# Patient Record
Sex: MALE | Race: WHITE | NOT HISPANIC OR LATINO | URBAN - METROPOLITAN AREA
[De-identification: names, ages, dates, MRNs, and addresses within clinical notes are randomized per-mention and may not be internally consistent; named-entity substitution may affect disease eponyms.]

---

## 2021-03-19 ENCOUNTER — EMERGENCY (EMERGENCY)
Facility: HOSPITAL | Age: 41
LOS: 1 days | Discharge: HOME | End: 2021-03-19
Attending: EMERGENCY MEDICINE | Admitting: EMERGENCY MEDICINE
Payer: MEDICAID

## 2021-03-19 VITALS
RESPIRATION RATE: 16 BRPM | HEIGHT: 68 IN | WEIGHT: 229.94 LBS | DIASTOLIC BLOOD PRESSURE: 93 MMHG | HEART RATE: 76 BPM | OXYGEN SATURATION: 98 % | SYSTOLIC BLOOD PRESSURE: 153 MMHG | TEMPERATURE: 98 F

## 2021-03-19 VITALS
RESPIRATION RATE: 16 BRPM | DIASTOLIC BLOOD PRESSURE: 85 MMHG | HEART RATE: 71 BPM | SYSTOLIC BLOOD PRESSURE: 140 MMHG | OXYGEN SATURATION: 96 %
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
## 2021-03-19 DIAGNOSIS — S05.01XA INJURY OF CONJUNCTIVA AND CORNEAL ABRASION WITHOUT FOREIGN BODY, RIGHT EYE, INITIAL ENCOUNTER: ICD-10-CM
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
## 2021-03-19 DIAGNOSIS — H57.11 OCULAR PAIN, RIGHT EYE: ICD-10-CM

## 2021-03-19 DIAGNOSIS — I10 ESSENTIAL (PRIMARY) HYPERTENSION: ICD-10-CM

## 2021-03-19 PROCEDURE — 99283 EMERGENCY DEPT VISIT LOW MDM: CPT

## 2021-03-19 RX ORDER — OFLOXACIN 0.3 %
1 DROPS OPHTHALMIC (EYE)
Qty: 5 | Refills: 1
Start: 2021-03-19 | End: 2021-04-01

## 2021-03-19 NOTE — ED PROVIDER NOTE - OBJECTIVE STATEMENT
40 y/o M w/ PMHx HTN presents to the ED c/o R eye pain and tearing since yesterday. Notes that x2 days ago was cutting metal without eye protection and felt something go into his R eye but had no sx until yesterday. Denies vision changes. No other acute complaints. Wears glasses for distance vision. Tetanus UTD 2 yr ago. 42 y/o M w/ PMHx HTN presents to the ED c/o R eye pain and tearing since yesterday. Notes that x2 days ago was cutting metal without eye protection and felt something go into his R eye but had no sx until yesterday. Denies vision changes. No other acute complaints. Wears prescription glasses for distance vision. Tetanus UTD 2 yr ago.

## 2021-03-19 NOTE — ED PROVIDER NOTE - EYES, MLM
R eye: conjunctiva injected, clear tearing, tetracaine drops instilled w/ pt stating eye pain resolved after, woods lamp and fluorescein stain noted to have corneal abrasion at 6 oclock position on cornea, no FB noted, both eyelids everted w/o FB noted

## 2021-03-19 NOTE — ED PROVIDER NOTE - NSFOLLOWUPCLINICS_GEN_ALL_ED_FT
[facility] - Ophthalmology Clinic  Ophthalmology  [address] [place] [address], 1[address]  [place], [place] [address]  Phone: [phone]  Fax:   Follow Up Time: 4-6 Days

## 2021-03-19 NOTE — ED PROVIDER NOTE - CONSTITUTIONAL, MLM
normal... Well appearing, awake, alert, oriented to person, place, time/situation and in no apparent distress. Well appearing, awake, alert, oriented   and in no apparent distress.

## 2021-03-19 NOTE — ED ADULT TRIAGE NOTE - CHIEF COMPLAINT QUOTE
Pt reports right eye pain and redness since last night. Pt states he was cutting a metal object 2 days ago and felt something hit his eye. Denies vision changes, drainage, fevers.

## 2021-03-19 NOTE — ED ADULT NURSE NOTE - OBJECTIVE STATEMENT
pt reports redness and pain to right eye since last night.  pt states 2 days ago was cutting a metal pipe and felt a particle hit the right eye.  pt denies drainage.  denies change in vision.  pt wears prescription eyeglasses.

## 2021-03-19 NOTE — ED PROVIDER NOTE - CLINICAL SUMMARY MEDICAL DECISION MAKING FREE TEXT BOX
40 y/o M presents w/ R eye pain and tearing since yesterday s/p the day before was cutting metal without eye protection and felt something go into his eye. No visual changes. Upon exam noted to have corneal abrasion. Will f/u w/ ophthalmology in a couple days. Return precautions given. Will prescribe Ocuflox ophthalmic solution. Pt expressed understanding. 40 y/o M presents w/ R eye pain and tearing since yesterday s/p the day before was cutting metal without eye protection and felt something go into his eye. No visual changes. Upon exam noted to have corneal abrasion. Pt prescribed antibiotic eye drops- Ocuflox ophthalmic solution. Will f/u w/ ophthalmology in a couple days. Return precautions given. Pt expressed understanding.

## 2021-03-19 NOTE — ED PROVIDER NOTE - PATIENT PORTAL LINK FT
You can access the FollowMyHealth Patient Portal offered by [facility] by registering at the following website: [email or web address] By joining [employer]’s FollowMyHealth portal, you will also be able to view your health information using other applications (apps) compatible with our system.

## 2021-03-19 NOTE — ED PROVIDER NOTE - NSFOLLOWUPINSTRUCTIONS_ED_ALL_ED_FT
Please follow up with an eye doctor in 2-5 days. Return to the ER if you develop any concerning symptoms.     Corneal Abrasion    The cornea is the clear covering at the front and center of the eye. This very thin tissue is made up of many layers. If a scratch or injury causes the corneal epithelium to come off, it is called a corneal abrasion. Symptoms include eye pain, redness, tearing, difficulty keeping eye open, and light sensitivity. Do not drive or operate machinery if your eye is patched.  Antibiotic eye drops may be prescribed to reduce the risk of infection.  It is important to follow up with an ophthalmologist (eye doctor) to ensure proper healing.    SEEK IMMEDIATE MEDICAL CARE IF YOU HAVE ANY OF THE FOLLOWING SYMPTOMS: discharge from eyes, changes in vision, fever, or swelling.

## 2021-05-21 ENCOUNTER — EMERGENCY (EMERGENCY)
Facility: HOSPITAL | Age: 41
LOS: 1 days | Discharge: HOME | End: 2021-05-21
Attending: EMERGENCY MEDICINE | Admitting: EMERGENCY MEDICINE
Payer: COMMERCIAL

## 2021-05-21 VITALS
SYSTOLIC BLOOD PRESSURE: 123 MMHG | HEART RATE: 65 BPM | RESPIRATION RATE: 18 BRPM | DIASTOLIC BLOOD PRESSURE: 83 MMHG | OXYGEN SATURATION: 98 %

## 2021-05-21 VITALS
DIASTOLIC BLOOD PRESSURE: 100 MMHG | HEIGHT: 68 IN | HEART RATE: 92 BPM | RESPIRATION RATE: 18 BRPM | WEIGHT: 240.08 LBS | TEMPERATURE: 98 F | SYSTOLIC BLOOD PRESSURE: 140 MMHG | OXYGEN SATURATION: 98 %

## 2021-05-21 DIAGNOSIS — K63.89 OTHER SPECIFIED DISEASES OF INTESTINE: ICD-10-CM

## 2021-05-21 DIAGNOSIS — R10.32 LEFT LOWER QUADRANT PAIN: ICD-10-CM

## 2021-05-21 DIAGNOSIS — I10 ESSENTIAL (PRIMARY) HYPERTENSION: ICD-10-CM

## 2021-05-21 DIAGNOSIS — Z90.49 ACQUIRED ABSENCE OF OTHER SPECIFIED PARTS OF DIGESTIVE TRACT: Chronic | ICD-10-CM

## 2021-05-21 LAB
ALBUMIN SERPL ELPH-MCNC: 4.4 G/DL — SIGNIFICANT CHANGE UP (ref 3.3–5)
ALP SERPL-CCNC: 56 U/L — SIGNIFICANT CHANGE UP (ref 40–120)
ALT FLD-CCNC: 82 U/L — HIGH (ref 10–45)
ANION GAP SERPL CALC-SCNC: 10 MMOL/L — SIGNIFICANT CHANGE UP (ref 5–17)
APPEARANCE UR: CLEAR — SIGNIFICANT CHANGE UP
AST SERPL-CCNC: 45 U/L — HIGH (ref 10–40)
BASOPHILS # BLD AUTO: 0.03 K/UL — SIGNIFICANT CHANGE UP (ref 0–0.2)
BASOPHILS NFR BLD AUTO: 0.4 % — SIGNIFICANT CHANGE UP (ref 0–2)
BILIRUB SERPL-MCNC: 0.6 MG/DL — SIGNIFICANT CHANGE UP (ref 0.2–1.2)
BILIRUB UR-MCNC: NEGATIVE — SIGNIFICANT CHANGE UP
BUN SERPL-MCNC: 13 MG/DL — SIGNIFICANT CHANGE UP (ref 7–23)
CALCIUM SERPL-MCNC: 9.6 MG/DL — SIGNIFICANT CHANGE UP (ref 8.4–10.5)
CHLORIDE SERPL-SCNC: 101 MMOL/L — SIGNIFICANT CHANGE UP (ref 96–108)
CO2 SERPL-SCNC: 29 MMOL/L — SIGNIFICANT CHANGE UP (ref 22–31)
COLOR SPEC: YELLOW — SIGNIFICANT CHANGE UP
CREAT SERPL-MCNC: 0.69 MG/DL — SIGNIFICANT CHANGE UP (ref 0.5–1.3)
DIFF PNL FLD: NEGATIVE — SIGNIFICANT CHANGE UP
EOSINOPHIL # BLD AUTO: 0.16 K/UL — SIGNIFICANT CHANGE UP (ref 0–0.5)
EOSINOPHIL NFR BLD AUTO: 2 % — SIGNIFICANT CHANGE UP (ref 0–6)
GLUCOSE SERPL-MCNC: 124 MG/DL — HIGH (ref 70–99)
GLUCOSE UR QL: NEGATIVE — SIGNIFICANT CHANGE UP
HCT VFR BLD CALC: 45.8 % — SIGNIFICANT CHANGE UP (ref 39–50)
HGB BLD-MCNC: 16.2 G/DL — SIGNIFICANT CHANGE UP (ref 13–17)
IMM GRANULOCYTES NFR BLD AUTO: 0.4 % — SIGNIFICANT CHANGE UP (ref 0–1.5)
KETONES UR-MCNC: NEGATIVE — SIGNIFICANT CHANGE UP
LEUKOCYTE ESTERASE UR-ACNC: NEGATIVE — SIGNIFICANT CHANGE UP
LIDOCAIN IGE QN: 50 U/L — SIGNIFICANT CHANGE UP (ref 7–60)
LYMPHOCYTES # BLD AUTO: 2.56 K/UL — SIGNIFICANT CHANGE UP (ref 1–3.3)
LYMPHOCYTES # BLD AUTO: 32 % — SIGNIFICANT CHANGE UP (ref 13–44)
MCHC RBC-ENTMCNC: 28.7 PG — SIGNIFICANT CHANGE UP (ref 27–34)
MCHC RBC-ENTMCNC: 35.4 GM/DL — SIGNIFICANT CHANGE UP (ref 32–36)
MCV RBC AUTO: 81.2 FL — SIGNIFICANT CHANGE UP (ref 80–100)
MONOCYTES # BLD AUTO: 0.75 K/UL — SIGNIFICANT CHANGE UP (ref 0–0.9)
MONOCYTES NFR BLD AUTO: 9.4 % — SIGNIFICANT CHANGE UP (ref 2–14)
NEUTROPHILS # BLD AUTO: 4.47 K/UL — SIGNIFICANT CHANGE UP (ref 1.8–7.4)
NEUTROPHILS NFR BLD AUTO: 55.8 % — SIGNIFICANT CHANGE UP (ref 43–77)
NITRITE UR-MCNC: NEGATIVE — SIGNIFICANT CHANGE UP
NRBC # BLD: 0 /100 WBCS — SIGNIFICANT CHANGE UP (ref 0–0)
PH UR: 7 — SIGNIFICANT CHANGE UP (ref 5–8)
PLATELET # BLD AUTO: 204 K/UL — SIGNIFICANT CHANGE UP (ref 150–400)
POTASSIUM SERPL-MCNC: 4 MMOL/L — SIGNIFICANT CHANGE UP (ref 3.5–5.3)
POTASSIUM SERPL-SCNC: 4 MMOL/L — SIGNIFICANT CHANGE UP (ref 3.5–5.3)
PROT SERPL-MCNC: 8.1 G/DL — SIGNIFICANT CHANGE UP (ref 6–8.3)
PROT UR-MCNC: NEGATIVE MG/DL — SIGNIFICANT CHANGE UP
RBC # BLD: 5.64 M/UL — SIGNIFICANT CHANGE UP (ref 4.2–5.8)
RBC # FLD: 12.3 % — SIGNIFICANT CHANGE UP (ref 10.3–14.5)
SODIUM SERPL-SCNC: 140 MMOL/L — SIGNIFICANT CHANGE UP (ref 135–145)
SP GR SPEC: 1.02 — SIGNIFICANT CHANGE UP (ref 1–1.03)
UROBILINOGEN FLD QL: 0.2 E.U./DL — SIGNIFICANT CHANGE UP
WBC # BLD: 8 K/UL — SIGNIFICANT CHANGE UP (ref 3.8–10.5)
WBC # FLD AUTO: 8 K/UL — SIGNIFICANT CHANGE UP (ref 3.8–10.5)

## 2021-05-21 PROCEDURE — 80053 COMPREHEN METABOLIC PANEL: CPT

## 2021-05-21 PROCEDURE — 99285 EMERGENCY DEPT VISIT HI MDM: CPT

## 2021-05-21 PROCEDURE — 99284 EMERGENCY DEPT VISIT MOD MDM: CPT | Mod: 25

## 2021-05-21 PROCEDURE — 81003 URINALYSIS AUTO W/O SCOPE: CPT

## 2021-05-21 PROCEDURE — 74177 CT ABD & PELVIS W/CONTRAST: CPT | Mod: 26,MG

## 2021-05-21 PROCEDURE — 36415 COLL VENOUS BLD VENIPUNCTURE: CPT

## 2021-05-21 PROCEDURE — G1004: CPT

## 2021-05-21 PROCEDURE — 85025 COMPLETE CBC W/AUTO DIFF WBC: CPT

## 2021-05-21 PROCEDURE — 74177 CT ABD & PELVIS W/CONTRAST: CPT

## 2021-05-21 PROCEDURE — 96374 THER/PROPH/DIAG INJ IV PUSH: CPT | Mod: XU

## 2021-05-21 PROCEDURE — 83690 ASSAY OF LIPASE: CPT

## 2021-05-21 RX ORDER — IOHEXOL 300 MG/ML
30 INJECTION, SOLUTION INTRAVENOUS ONCE
Refills: 0 | Status: COMPLETED | OUTPATIENT
Start: 2021-05-21 | End: 2021-05-21

## 2021-05-21 RX ORDER — MORPHINE SULFATE 50 MG/1
4 CAPSULE, EXTENDED RELEASE ORAL ONCE
Refills: 0 | Status: DISCONTINUED | OUTPATIENT
Start: 2021-05-21 | End: 2021-05-21

## 2021-05-21 RX ADMIN — MORPHINE SULFATE 4 MILLIGRAM(S): 50 CAPSULE, EXTENDED RELEASE ORAL at 12:25

## 2021-05-21 RX ADMIN — MORPHINE SULFATE 4 MILLIGRAM(S): 50 CAPSULE, EXTENDED RELEASE ORAL at 13:05

## 2021-05-21 RX ADMIN — IOHEXOL 30 MILLILITER(S): 300 INJECTION, SOLUTION INTRAVENOUS at 12:25

## 2021-05-21 NOTE — ED PROVIDER NOTE - PATIENT PORTAL LINK FT
You can access the FollowMyHealth Patient Portal offered by [facility] by registering at the following website: [email or web address]. By joining [employer]’s FollowMyHealth portal, you will also be able to view your health information using other applications (apps) compatible with our system.

## 2021-05-21 NOTE — ED ADULT TRIAGE NOTE - CHIEF COMPLAINT QUOTE
pt c/o LUQ pain x1 day, wrapping around to flank. Denies N/V/D or hematuria. Denies hx of abdominal surgeries.

## 2021-05-21 NOTE — ED PROVIDER NOTE - CLINICAL SUMMARY MEDICAL DECISION MAKING FREE TEXT BOX
42 yo male with 2 days of left mid abd pain, worse with movement.  will get abd labs, ct abd ro diverticulitis/obstruction  pain meds 40 yo male with 2 days of left mid abd pain, worse with movement.  will get abd labs, ct abd ro diverticulitis/obstruction  pain meds  labs normal  ct shows epiploic appendigitis

## 2021-05-21 NOTE — ED PROVIDER NOTE - OBJECTIVE STATEMENT
left sided mid abd pain started yesterday.  worse with movement  no fever, no urinary symptoms, no n/v, normal BM this morning  never had this before

## 2021-05-21 NOTE — ED PROVIDER NOTE - NSFOLLOWUPINSTRUCTIONS_ED_ALL_ED_FT
follow up regular doctor in 2-3 days  return for worsening symptoms, fever, vomiting        Epiploic Appendagitis       Epiploic appendagitis (EA) is swelling and irritation of pouches (epiploic appendages) that are attached to the end portion of the large intestine (colon). These pouches contain fat and are attached to the outside of the colon. This condition causes sudden pain in the lower abdomen.    EA is rare, and it usually goes away on its own. It can feel like other abdomen (abdominal) conditions, such as appendicitis, a gallbladder attack (cholecystitis), or diverticulitis.      What are the causes?  This condition may be caused by:  •Blocked blood flow due to a blood clot.      •Twisting (torsion) of the epiploic appendages.      •Conditions that cause swelling and inflammation of nearby tissue, such as long-term (chronic) diarrhea, Crohn disease, or ulcerative colitis.        What increases the risk?  You are more likely to develop this condition if:  •You are 40–50 years old.      •You are male.      •You are overweight.        What are the signs or symptoms?    The most common symptom of this condition is lower abdominal pain that starts suddenly and can be severe. Pain can be anywhere in the lower abdomen, but it is more common on the left side. The pain may get worse with movement or when pressing on your abdomen.  The following symptoms are possible, but they are not common:  •Fever.      •Nausea.      •Diarrhea or constipation.        How is this diagnosed?  Your health care provider may suspect EA if you have sudden lower abdominal pain without other symptoms. The condition may be diagnosed based on:  •CT scan. This is the best way to diagnose EA.      •Your symptoms and a physical exam.      •Ultrasound.      •A blood test (complete blood count, CBC).      •A procedure to look inside your abdomen using a lighted scope that has a tiny camera on the end (laparoscopy). This may be done to confirm the diagnosis.        How is this treated?    EA usually goes away without treatment. Your health care provider may recommend NSAIDs (such as aspirin or ibuprofen) to reduce pain and inflammation. In rare cases, if the condition does not improve or it keeps coming back, you may need surgery to remove the appendages.      Follow these instructions at home:    •Take over-the-counter and prescription medicines only as told by your health care provider.      •Return to your normal activities as told by your health care provider. Ask your health care provider what activities are safe for you.      •Keep all follow-up visits as told by your health care provider. This is important.        Contact a health care provider if:    •You have a fever.      •You develop nausea, vomiting, diarrhea, or constipation.      •Your pain gets worse.      •Your pain lasts longer than 10 days.        Get help right away if:    •You have severe pain that does not get better after you take medicine.        Summary    •Epiploic appendagitis (EA) is swelling and irritation of pouches (epiploic appendages) that are attached to the outside of the colon. The colon is the end portion of the large intestine.      •EA can feel like other abdominal conditions, such as appendicitis, a gallbladder attack (cholecystitis), or diverticulitis.      •EA usually goes away without treatment. Your health care provider may recommend NSAIDs (such as aspirin or ibuprofen) to reduce pain and inflammation.      This information is not intended to replace advice given to you by your health care provider. Make sure you discuss any questions you have with your health care provider.      Document Revised: 04/09/2020 Document Reviewed: 07/05/2018    [employer] Patient Education © 2021 [employer]

## 2024-12-17 ENCOUNTER — RESULT REVIEW (OUTPATIENT)
Age: 44
End: 2024-12-17

## 2024-12-17 ENCOUNTER — INPATIENT (INPATIENT)
Facility: HOSPITAL | Age: 44
LOS: 0 days | Discharge: HOME | End: 2024-12-18
Attending: PSYCHIATRY & NEUROLOGY | Admitting: PSYCHIATRY & NEUROLOGY
Payer: COMMERCIAL

## 2024-12-17 VITALS
TEMPERATURE: 98 F | WEIGHT: 253.75 LBS | HEART RATE: 111 BPM | HEIGHT: 71 IN | SYSTOLIC BLOOD PRESSURE: 192 MMHG | OXYGEN SATURATION: 99 % | RESPIRATION RATE: 17 BRPM | DIASTOLIC BLOOD PRESSURE: 106 MMHG

## 2024-12-17 DIAGNOSIS — Z90.49 ACQUIRED ABSENCE OF OTHER SPECIFIED PARTS OF DIGESTIVE TRACT: Chronic | ICD-10-CM

## 2024-12-17 LAB
ALBUMIN SERPL ELPH-MCNC: 4.2 G/DL — SIGNIFICANT CHANGE UP (ref 3.3–5)
ALP SERPL-CCNC: 64 U/L — SIGNIFICANT CHANGE UP (ref 40–120)
ALT FLD-CCNC: 120 U/L — HIGH (ref 10–45)
ANION GAP SERPL CALC-SCNC: 9 MMOL/L — SIGNIFICANT CHANGE UP (ref 5–17)
APTT BLD: 32 SEC — SIGNIFICANT CHANGE UP (ref 24.5–35.6)
AST SERPL-CCNC: 68 U/L — HIGH (ref 10–40)
BASOPHILS # BLD AUTO: 0.03 K/UL — SIGNIFICANT CHANGE UP (ref 0–0.2)
BASOPHILS NFR BLD AUTO: 0.4 % — SIGNIFICANT CHANGE UP (ref 0–2)
BILIRUB SERPL-MCNC: 0.5 MG/DL — SIGNIFICANT CHANGE UP (ref 0.2–1.2)
BUN SERPL-MCNC: 14 MG/DL — SIGNIFICANT CHANGE UP (ref 7–23)
CALCIUM SERPL-MCNC: 9.7 MG/DL — SIGNIFICANT CHANGE UP (ref 8.4–10.5)
CHLORIDE SERPL-SCNC: 97 MMOL/L — SIGNIFICANT CHANGE UP (ref 96–108)
CO2 SERPL-SCNC: 27 MMOL/L — SIGNIFICANT CHANGE UP (ref 22–31)
CREAT SERPL-MCNC: 0.64 MG/DL — SIGNIFICANT CHANGE UP (ref 0.5–1.3)
EGFR: 118 ML/MIN/1.73M2 — SIGNIFICANT CHANGE UP
EOSINOPHIL # BLD AUTO: 0.17 K/UL — SIGNIFICANT CHANGE UP (ref 0–0.5)
EOSINOPHIL NFR BLD AUTO: 2.1 % — SIGNIFICANT CHANGE UP (ref 0–6)
GLUCOSE SERPL-MCNC: 181 MG/DL — HIGH (ref 70–99)
HCT VFR BLD CALC: 46.6 % — SIGNIFICANT CHANGE UP (ref 39–50)
HGB BLD-MCNC: 16.6 G/DL — SIGNIFICANT CHANGE UP (ref 13–17)
IMM GRANULOCYTES NFR BLD AUTO: 0.4 % — SIGNIFICANT CHANGE UP (ref 0–0.9)
INR BLD: 0.95 — SIGNIFICANT CHANGE UP (ref 0.85–1.16)
LYMPHOCYTES # BLD AUTO: 2.2 K/UL — SIGNIFICANT CHANGE UP (ref 1–3.3)
LYMPHOCYTES # BLD AUTO: 26.6 % — SIGNIFICANT CHANGE UP (ref 13–44)
MCHC RBC-ENTMCNC: 29.1 PG — SIGNIFICANT CHANGE UP (ref 27–34)
MCHC RBC-ENTMCNC: 35.6 G/DL — SIGNIFICANT CHANGE UP (ref 32–36)
MCV RBC AUTO: 81.8 FL — SIGNIFICANT CHANGE UP (ref 80–100)
MONOCYTES # BLD AUTO: 0.62 K/UL — SIGNIFICANT CHANGE UP (ref 0–0.9)
MONOCYTES NFR BLD AUTO: 7.5 % — SIGNIFICANT CHANGE UP (ref 2–14)
NEUTROPHILS # BLD AUTO: 5.21 K/UL — SIGNIFICANT CHANGE UP (ref 1.8–7.4)
NEUTROPHILS NFR BLD AUTO: 63 % — SIGNIFICANT CHANGE UP (ref 43–77)
NRBC # BLD: 0 /100 WBCS — SIGNIFICANT CHANGE UP (ref 0–0)
PLATELET # BLD AUTO: 207 K/UL — SIGNIFICANT CHANGE UP (ref 150–400)
POTASSIUM SERPL-MCNC: 3.6 MMOL/L — SIGNIFICANT CHANGE UP (ref 3.5–5.3)
POTASSIUM SERPL-SCNC: 3.6 MMOL/L — SIGNIFICANT CHANGE UP (ref 3.5–5.3)
PROT SERPL-MCNC: 8.8 G/DL — HIGH (ref 6–8.3)
PROTHROM AB SERPL-ACNC: 10.9 SEC — SIGNIFICANT CHANGE UP (ref 9.9–13.4)
RBC # BLD: 5.7 M/UL — SIGNIFICANT CHANGE UP (ref 4.2–5.8)
RBC # FLD: 12.3 % — SIGNIFICANT CHANGE UP (ref 10.3–14.5)
SODIUM SERPL-SCNC: 133 MMOL/L — LOW (ref 135–145)
TROPONIN T, HIGH SENSITIVITY RESULT: <6 NG/L — SIGNIFICANT CHANGE UP (ref 0–51)
WBC # BLD: 8.26 K/UL — SIGNIFICANT CHANGE UP (ref 3.8–10.5)
WBC # FLD AUTO: 8.26 K/UL — SIGNIFICANT CHANGE UP (ref 3.8–10.5)

## 2024-12-17 PROCEDURE — 70450 CT HEAD/BRAIN W/O DYE: CPT | Mod: 26,MC,59

## 2024-12-17 PROCEDURE — 70496 CT ANGIOGRAPHY HEAD: CPT | Mod: 26,MC

## 2024-12-17 PROCEDURE — 93306 TTE W/DOPPLER COMPLETE: CPT | Mod: 26

## 2024-12-17 PROCEDURE — 0042T: CPT | Mod: MC

## 2024-12-17 PROCEDURE — 70551 MRI BRAIN STEM W/O DYE: CPT | Mod: 26

## 2024-12-17 PROCEDURE — 99285 EMERGENCY DEPT VISIT HI MDM: CPT

## 2024-12-17 PROCEDURE — 93010 ELECTROCARDIOGRAM REPORT: CPT

## 2024-12-17 PROCEDURE — 70498 CT ANGIOGRAPHY NECK: CPT | Mod: 26,MC

## 2024-12-17 RX ORDER — BENAZEPRIL/HYDROCHLOROTHIAZIDE 10-12.5 MG
1 TABLET ORAL
Refills: 0 | DISCHARGE

## 2024-12-17 RX ORDER — ENOXAPARIN SODIUM 30 MG/.3ML
40 INJECTION SUBCUTANEOUS EVERY 12 HOURS
Refills: 0 | Status: DISCONTINUED | OUTPATIENT
Start: 2024-12-17 | End: 2024-12-18

## 2024-12-17 RX ADMIN — ENOXAPARIN SODIUM 40 MILLIGRAM(S): 30 INJECTION SUBCUTANEOUS at 18:28

## 2024-12-17 RX ADMIN — Medication 81 MILLIGRAM(S): at 18:29

## 2024-12-17 RX ADMIN — Medication 80 MILLIGRAM(S): at 22:06

## 2024-12-17 NOTE — H&P ADULT - ASSESSMENT
44y Male with PMHx of HTN presenting to ED on 12/17 with sudden onset L-sided face, arm and leg numbness. LKW 12/17 at 8:00AM. States having 2 episodes of L -sided numbness. First episode started on 12/16 at 7:00PM and lasted for about 2 hours. States BP was 160/100 at time of symptoms, which were accompanied by a generalized headache and ?blurry vison. Took his BP med and symptoms eventually resolved. Patient went to bed and woke up at 6:30AM the next day. States L-sided numbness restarted at 8:00AM, again involving face ,arm and leg. Reports arm and leg numbness have resolved since arriving to ED, however still feels numbness on L-side of mouth. Unable to quantify difference in sensation, but states it is mostly "half of normal". SBP of arrival 192/106, eventually went down to 152/94. ABCD2 score 3 (2 for timing of sx, 1 for BP). NIHSS 1 for decreased sensation on L face. mRS 0. CTH, CTP, CTA H/N all negative. Patient admitted to stroke tele for TIA rule out vs R-thalamic stroke vs hypertensive emergency    Neuro  #CVA workup  - start aspirin 81mg daily  - start atorvastatin 80mg daily  - q4hr stroke neuro checks and vitals  - obtain MRI Brain without contrast (short stroke protocol)  - Stroke Code HCT Results: no acute infarct or hemorrhage  - Stroke Code CTA Results: no evidence of stenosis or LVO  - Stroke education    Cards  #HTN  - Goal SBP <190/100  - hold home blood pressure medication for now  - obtain TTE with bubble    #HLD  - high dose statin as above in CVA  - LDL results: pending    Pulm  - call provider if SPO2 < 94%    GI  #Nutrition/Fluids/Electrolytes   - replete K<4 and Mg <2  - Diet: DASH  - IVF: none    Renal  - daily bmp    Infectious Disease  - afebrile on admission    Endocrine  #DM  - A1C results: pending  - ISS    - TSH results: pending    DVT Prophylaxis  - lovenox sq for DVT prophylaxis   - SCDs for DVT prophylaxis     Dispo: pending PT/OT eval    Pt discussed with Dr. Torres

## 2024-12-17 NOTE — H&P ADULT - HISTORY OF PRESENT ILLNESS
**STROKE CODE CONSULT NOTE**    Last known well time/Time of onset of symptoms: 12/17 8:00AM    HPI: 44y Male with PMHx of HTN presenting to ED on 12/17 with sudden onset L-sided face, arm and leg numbness. LKW 12/17 at 8:00AM. States having 2 episodes of L -sided numbness. First episode started on 12/16 at 7:00PM and lasted for about 2 hours. States BP was 160/100 at time of symptoms, which were accompanied by a generalized headache and ?blurry vison. Took his BP med and symptoms eventually resolved. Patient went to bed and woke up at 6:30AM the next day. States L-sided numbness restarted at 8:00AM, again involving face ,a rm and leg. Reports arm and leg numbness have resolved since arriving to ED, however still feels numbness on L-side of mouth. Unable to quantify difference in sensation, but states it is mostly "half of normal". SBP of arrival 192/106, eventually went down to 152/94. ABCD2 score 3 (2 for timing of sx, 1 for BP).     T(C): 36.9 (12-17-24 @ 11:15), Max: 36.9 (12-17-24 @ 10:37)  HR: 74 (12-17-24 @ 11:15) (74 - 111)  BP: 152/94 (12-17-24 @ 11:15) (152/94 - 192/106)  RR: 18 (12-17-24 @ 11:15) (17 - 18)  SpO2: 96% (12-17-24 @ 11:15) (96% - 99%)      MEDICATIONS  (STANDING):    MEDICATIONS  (PRN):    Allergies    No Known Allergies    Intolerances      Vital Signs Last 24 Hrs  T(C): 36.9 (17 Dec 2024 11:15), Max: 36.9 (17 Dec 2024 10:37)  T(F): 98.5 (17 Dec 2024 11:15), Max: 98.5 (17 Dec 2024 10:37)  HR: 74 (17 Dec 2024 11:15) (74 - 111)  BP: 152/94 (17 Dec 2024 11:15) (152/94 - 192/106)  BP(mean): --  RR: 18 (17 Dec 2024 11:15) (17 - 18)  SpO2: 96% (17 Dec 2024 11:15) (96% - 99%)    Parameters below as of 17 Dec 2024 11:15  Patient On (Oxygen Delivery Method): room air      -----------------------------------------------------------------------------------------------------------------  IV-thrombolytic (Y/N):  N                           Reason thrombolytic not given: Improving non-debilitating symptoms. No MT offered due to absence of LVO

## 2024-12-17 NOTE — H&P ADULT - NSHPLABSRESULTS_GEN_ALL_CORE
Fingerstick Blood Glucose: CAPILLARY BLOOD GLUCOSE      POCT Blood Glucose.: 198 mg/dL (17 Dec 2024 10:32)    LABS:                        16.6   8.26  )-----------( 207      ( 17 Dec 2024 10:38 )             46.6     12-17    133[L]  |  97  |  14  ----------------------------<  181[H]  3.6   |  27  |  0.64    Ca    9.7      17 Dec 2024 10:38    TPro  8.8[H]  /  Alb  4.2  /  TBili  0.5  /  DBili  x   /  AST  68[H]  /  ALT  120[H]  /  AlkPhos  64  12-17    PT/INR - ( 17 Dec 2024 10:38 )   PT: 10.9 sec;   INR: 0.95          PTT - ( 17 Dec 2024 10:38 )  PTT:32.0 sec      Urinalysis Basic - ( 17 Dec 2024 10:38 )    Color: x / Appearance: x / SG: x / pH: x  Gluc: 181 mg/dL / Ketone: x  / Bili: x / Urobili: x   Blood: x / Protein: x / Nitrite: x   Leuk Esterase: x / RBC: x / WBC x   Sq Epi: x / Non Sq Epi: x / Bacteria: x        RADIOLOGY & ADDITIONAL STUDIES:    CT of the brain:  No acute intracranial injury  The results were discussed with Ruth Ann Thomas at 10:46 AM on   12/17/2024. This study was performed on 10:41 AM on 12/17/2024    CT perfusion:  No evidence of CT perfusion deficit.      CTA of the intracranial circulation.  No evidence of stenosis. No evidence of aneurysm.      CTA of the extracranial circulation.  No evidence of carotid stenosis.

## 2024-12-17 NOTE — H&P ADULT - NSHPREVIEWOFSYSTEMS_GEN_ALL_CORE
ROS:   Constitutional: No fever, weight loss or fatigue  Eyes: No eye pain, visual disturbances, or discharge  ENMT:  No difficulty hearing, tinnitus; No sinus or throat pain  Neck: No pain or stiffness  Respiratory: No cough, wheezing, chills or hemoptysis  Cardiovascular: No chest pain, palpitations, shortness of breath, or leg swelling  Gastrointestinal: No abdominal pain. No nausea, vomiting or hematemesis; No diarrhea or constipation. Nohematochezia.  Genitourinary: No dysuria, frequency, hematuria or incontinence  Neurological: As per HPI  Skin: No itching, burning, rashes or lesions   Endocrine: No heat or cold intolerance; No hair loss  Musculoskeletal: No joint pain or swelling; No muscle, back or extremity pain  Heme/Lymph: No easy bruising or bleeding gums

## 2024-12-17 NOTE — ED ADULT NURSE REASSESSMENT NOTE - NS ED NURSE REASSESS COMMENT FT1
Patient a/oX 3, c/o of continued numbness on mouth area, states improved from 1 hour ago, no other numbness/tingling of left arm/leg, no neuro deficits, able to ambulate w/ steady gait. NSR on cardiac monitor, vital signss tble.  Re-evaluated by Neuro; for admit 5 lachman/neuro.  MRI pending.  Stable and comfortable.

## 2024-12-17 NOTE — ED ADULT TRIAGE NOTE - CHIEF COMPLAINT QUOTE
Pt presents to ED C/O L sided face and arm numbness starting last night at 9pm. Pt denies CP/ neck pain/ back pain. Denies blood thinners. Hx HTN. Spouse at bedside. Stroke Code initiated.

## 2024-12-17 NOTE — ED PROVIDER NOTE - OBJECTIVE STATEMENT
44-year-old male non-smoker with history of hypertension presenting with 2 episodes of left-sided numbness.  Patient reports around 7 PM last night while at rest patient noted numbness of his left face left arm and left leg this gradually improved over the course of several hours and resolved by 10 PM last night.  Patient then reports the numbness to the left side of his body again returned at around 8 AM today while at rest and has been progressively improving since and now it is really only involving the left side of face and very mild version to left arm.  There was never any weakness and no changes to speech gait or vision.  Wife is at bedside and speaks fluent English and Citizen of [place] and feels comfortable with the translation and declined official  services.  Stroke code called to triage.

## 2024-12-17 NOTE — ED ADULT NURSE REASSESSMENT NOTE - NS ED NURSE REASSESS COMMENT FT1
Patient aoX3, c/o currently of mild mouth numbness, states left arm and leg numbness resolved, no facial asymmetry, no slurred speech, no am /leg drift, FROM to all extremities, able to ambulate w/ steady gait.  NSR on EKG, elevated BP, other vitals stable.  No chest pain, no SOB.  Left AC PIV #18 in place, all labs sent,n o complications.  CT scan done, code stroke activated.  NIH scale score 1 for mouth numbness, GCS 15, CHANDLER, Dysphagia screen passed.  Neuro checks ongoing hourly.  Neuro at bedside.  Will continue to monitor.  STable and comfortable.

## 2024-12-17 NOTE — H&P ADULT - NSHPPHYSICALEXAM_GEN_ALL_CORE
Physical exam:  Constitutional: No acute distress, conversant  Eyes: Anicteric sclerae, moist conjunctivae, see below for CNs  Neck: trachea midline, FROM, supple, no thyromegaly or lymphadenopathy  Cardiovascular: Regular rate and rhythm on monitor  Pulmonary: No use of accessory muscles  Extremities: no edema    Neurologic:  -Mental status: Awake, alert, oriented to person, age, and month. Speech is fluent with intact naming, repetition, and comprehension, no dysarthria.Follows 2-step and cross commands.  -Cranial nerves:   II: Visual fields are full to confrontation.  III, IV, VI: Extraocular movements are intact without nystagmus. Pupils equally round and reactive to light  V:  Decreased sensation on L V3 distribution. L V1-V2 and R V1-V3 intact  VII: Face is symmetric with normal eye closure and smile  VIII: Hearing is bilaterally intact to finger rub  XII: Tongue protrudes midline  Motor: Normal bulk and tone. No pronator drift. Strength bilateral upper extremity 5/5, bilateral lower extremities 5/5.  Sensation: Intact to light touch bilaterally. No neglect or extinction on double simultaneous testing.  Coordination: No dysmetria on finger-to-nose and heel-to-shin bilaterally  Gait: Narrow gait and steady    NIHSS: 1

## 2024-12-17 NOTE — H&P ADULT - NS ATTEND AMEND GEN_ALL_CORE FT
I spent 35 mins on the encounter, inclusie of: review of objective data, interview of patient, and discussion of assessment and plan with patient/family/multidisciplinary team. I agree with ACP/ resident and/or fellow documentation except where indicated above.

## 2024-12-17 NOTE — PATIENT PROFILE ADULT - FALL HARM RISK - HARM RISK INTERVENTIONS

## 2024-12-17 NOTE — ED PROVIDER NOTE - CLINICAL SUMMARY MEDICAL DECISION MAKING FREE TEXT BOX
Patient with acute onset of left-sided numbness yesterday resolved and then again today this morning with rapid improvement.  Concern for pure sensory stroke or TIA.  Patient was deemed not appropriate for tenecteplase as symptoms rapidly improved.  Stroke team was engaged from triage and the labs CAT scan EKG obtained and results noted.  Patient to be admitted for further workup and additional testing.  No evidence of MI, dissection or cord compression given clinical picture.

## 2024-12-17 NOTE — ED ADULT NURSE NOTE - OBJECTIVE STATEMENT
Patient states noted numbness of left side of face, arm and leg at 7pm last night, resolved on its own at 10 pm.  States woke up at 6:30 am today, noted return of left side face, arm and leg numbness at 8am, resolved on its own at 10 am, arrives to ED with only mouth numbness, no facial asymmetry, no arm /leg drift, no weakness, no slurred speech, able to ambulate w/ steady gait, no dizziness/lightheadedness, no chest pain, no SOB. Immediate upgrade to Dr. Hearn.  Code Stroke activated.  pmhx  HTN

## 2024-12-18 ENCOUNTER — TRANSCRIPTION ENCOUNTER (OUTPATIENT)
Age: 44
End: 2024-12-18

## 2024-12-18 VITALS — TEMPERATURE: 98 F

## 2024-12-18 DIAGNOSIS — I10 ESSENTIAL (PRIMARY) HYPERTENSION: ICD-10-CM

## 2024-12-18 DIAGNOSIS — E11.9 TYPE 2 DIABETES MELLITUS WITHOUT COMPLICATIONS: ICD-10-CM

## 2024-12-18 LAB
A1C WITH ESTIMATED AVERAGE GLUCOSE RESULT: 6.7 % — HIGH (ref 4–5.6)
ANION GAP SERPL CALC-SCNC: 10 MMOL/L — SIGNIFICANT CHANGE UP (ref 5–17)
BUN SERPL-MCNC: 15 MG/DL — SIGNIFICANT CHANGE UP (ref 7–23)
CALCIUM SERPL-MCNC: 9 MG/DL — SIGNIFICANT CHANGE UP (ref 8.4–10.5)
CHLORIDE SERPL-SCNC: 100 MMOL/L — SIGNIFICANT CHANGE UP (ref 96–108)
CHOLEST SERPL-MCNC: 206 MG/DL — HIGH
CO2 SERPL-SCNC: 25 MMOL/L — SIGNIFICANT CHANGE UP (ref 22–31)
CREAT SERPL-MCNC: 0.7 MG/DL — SIGNIFICANT CHANGE UP (ref 0.5–1.3)
EGFR: 115 ML/MIN/1.73M2 — SIGNIFICANT CHANGE UP
ESTIMATED AVERAGE GLUCOSE: 146 MG/DL — HIGH (ref 68–114)
GLUCOSE SERPL-MCNC: 143 MG/DL — HIGH (ref 70–99)
HCT VFR BLD CALC: 44.5 % — SIGNIFICANT CHANGE UP (ref 39–50)
HDLC SERPL-MCNC: 29 MG/DL — LOW
HGB BLD-MCNC: 15.8 G/DL — SIGNIFICANT CHANGE UP (ref 13–17)
LIPID PNL WITH DIRECT LDL SERPL: 124 MG/DL — HIGH
MAGNESIUM SERPL-MCNC: 2 MG/DL — SIGNIFICANT CHANGE UP (ref 1.6–2.6)
MCHC RBC-ENTMCNC: 29.4 PG — SIGNIFICANT CHANGE UP (ref 27–34)
MCHC RBC-ENTMCNC: 35.5 G/DL — SIGNIFICANT CHANGE UP (ref 32–36)
MCV RBC AUTO: 82.7 FL — SIGNIFICANT CHANGE UP (ref 80–100)
NON HDL CHOLESTEROL: 177 MG/DL — HIGH
NRBC # BLD: 0 /100 WBCS — SIGNIFICANT CHANGE UP (ref 0–0)
PHOSPHATE SERPL-MCNC: 3.9 MG/DL — SIGNIFICANT CHANGE UP (ref 2.5–4.5)
PLATELET # BLD AUTO: 165 K/UL — SIGNIFICANT CHANGE UP (ref 150–400)
POTASSIUM SERPL-MCNC: 3.6 MMOL/L — SIGNIFICANT CHANGE UP (ref 3.5–5.3)
POTASSIUM SERPL-SCNC: 3.6 MMOL/L — SIGNIFICANT CHANGE UP (ref 3.5–5.3)
RBC # BLD: 5.38 M/UL — SIGNIFICANT CHANGE UP (ref 4.2–5.8)
RBC # FLD: 12.4 % — SIGNIFICANT CHANGE UP (ref 10.3–14.5)
SODIUM SERPL-SCNC: 135 MMOL/L — SIGNIFICANT CHANGE UP (ref 135–145)
TRIGL SERPL-MCNC: 296 MG/DL — HIGH
TSH SERPL-MCNC: 2.01 UIU/ML — SIGNIFICANT CHANGE UP (ref 0.27–4.2)
WBC # BLD: 6.45 K/UL — SIGNIFICANT CHANGE UP (ref 3.8–10.5)
WBC # FLD AUTO: 6.45 K/UL — SIGNIFICANT CHANGE UP (ref 3.8–10.5)

## 2024-12-18 PROCEDURE — 93005 ELECTROCARDIOGRAM TRACING: CPT

## 2024-12-18 PROCEDURE — 80061 LIPID PANEL: CPT

## 2024-12-18 PROCEDURE — 99285 EMERGENCY DEPT VISIT HI MDM: CPT | Mod: 25

## 2024-12-18 PROCEDURE — 85610 PROTHROMBIN TIME: CPT

## 2024-12-18 PROCEDURE — 85730 THROMBOPLASTIN TIME PARTIAL: CPT

## 2024-12-18 PROCEDURE — 85027 COMPLETE CBC AUTOMATED: CPT

## 2024-12-18 PROCEDURE — 84100 ASSAY OF PHOSPHORUS: CPT

## 2024-12-18 PROCEDURE — 84484 ASSAY OF TROPONIN QUANT: CPT

## 2024-12-18 PROCEDURE — 83036 HEMOGLOBIN GLYCOSYLATED A1C: CPT

## 2024-12-18 PROCEDURE — 70450 CT HEAD/BRAIN W/O DYE: CPT | Mod: MC

## 2024-12-18 PROCEDURE — 80048 BASIC METABOLIC PNL TOTAL CA: CPT

## 2024-12-18 PROCEDURE — 82962 GLUCOSE BLOOD TEST: CPT

## 2024-12-18 PROCEDURE — 93306 TTE W/DOPPLER COMPLETE: CPT

## 2024-12-18 PROCEDURE — 36415 COLL VENOUS BLD VENIPUNCTURE: CPT

## 2024-12-18 PROCEDURE — 99222 1ST HOSP IP/OBS MODERATE 55: CPT | Mod: GC

## 2024-12-18 PROCEDURE — 83735 ASSAY OF MAGNESIUM: CPT

## 2024-12-18 PROCEDURE — 97161 PT EVAL LOW COMPLEX 20 MIN: CPT

## 2024-12-18 PROCEDURE — 85025 COMPLETE CBC W/AUTO DIFF WBC: CPT

## 2024-12-18 PROCEDURE — 70498 CT ANGIOGRAPHY NECK: CPT | Mod: MC

## 2024-12-18 PROCEDURE — 97165 OT EVAL LOW COMPLEX 30 MIN: CPT

## 2024-12-18 PROCEDURE — 99222 1ST HOSP IP/OBS MODERATE 55: CPT

## 2024-12-18 PROCEDURE — 80053 COMPREHEN METABOLIC PANEL: CPT

## 2024-12-18 PROCEDURE — 84443 ASSAY THYROID STIM HORMONE: CPT

## 2024-12-18 PROCEDURE — 70551 MRI BRAIN STEM W/O DYE: CPT | Mod: MC

## 2024-12-18 PROCEDURE — 70496 CT ANGIOGRAPHY HEAD: CPT | Mod: MC

## 2024-12-18 PROCEDURE — 0042T: CPT | Mod: MC

## 2024-12-18 RX ORDER — CLOPIDOGREL 75 MG/1
1 TABLET, FILM COATED ORAL
Qty: 21 | Refills: 0
Start: 2024-12-18 | End: 2025-01-07

## 2024-12-18 RX ADMIN — ENOXAPARIN SODIUM 40 MILLIGRAM(S): 30 INJECTION SUBCUTANEOUS at 06:07

## 2024-12-18 NOTE — DISCHARGE NOTE PROVIDER - CARE PROVIDER_API CALL
Eloisa Torres  Neurology  [address]  [place] [address]  Phone: [phone]  Fax: [phone]  Follow Up Time:

## 2024-12-18 NOTE — PHYSICAL THERAPY INITIAL EVALUATION ADULT - MODALITIES TREATMENT COMMENTS
R hand dominant; (L) hand  5/5, (R) hand  5/5. CN Testing: B/L Frontalis intact; B/L buccinator intact; smile symmetrical; tongue protrusion at midline; B/L eyes open/close intact; Shoulder elevation: intact bilaterally; Vision H-Test: bilateral tracking and smooth pursuit intact; Convergence/Divergence: intact; Vision Quadrant Test: intact bilaterally.

## 2024-12-18 NOTE — DISCHARGE NOTE PROVIDER - NSDCCPCAREPLAN_GEN_ALL_CORE_FT
PRINCIPAL DISCHARGE DIAGNOSIS  Diagnosis: Left sided numbness  Assessment and Plan of Treatment: You presented to the hospital after an episode of loss of sensation of your left side of the body and you were admitted to evaluate whether you may have had a stroke. A stroke, also known as, cerebrovascular accident (CVA), is damage to the brain caused by decreased blood flow. A number of tests were conducted, including CT, CT angiogram, and MRI, and there were no signs of new damage to the brain. An echocardiogram (ECHO) ruled out a heart-related explanation for your symptoms. It is possible that your symptoms were caused by a TIA (transient ischemic attack) which is a temporary decrease in blood flow, without damage to, the brain.  -Please take aspirin 81mg and plavix 75mg every day for 21 days and then continue to take aspirin 81mg only going forward  -Please follow up with your neurologist within 1-2 weeks of discharge to discuss your recent hospitalization  If your symptoms recur, or become more severe, please call your neurologist. If you have an acute onset of numbness, weakness, tingling, slurred speech, difficulties with coordination, or severe dizziness, please come to the emergency room to be evaluated.       PRINCIPAL DISCHARGE DIAGNOSIS  Diagnosis: Transient ischemic attack  Assessment and Plan of Treatment: You were admitted to the hospital because you had symptoms of   left sided numbness, which resolved. This is called a transient ischemic attack, or TIA. This is when a blood clot temporarily blocks a blood vessel in your brain, but does not last long enough to cause permanent damage in your brain. A TIA is a warning sign of a future stroke, which can permanently damage areas in the brain that control parts of the body. It is important to treat a TIA to prevent strokes.   You have these risks factors of TIA and future strokes. Please see secondary diagnoses for further explanation: (DC summary authour, delete below as needed)  -atrial fibrillation  -high blood pressure (also called hypertension)  -diabetes mellitus  -high cholesterol (also called hyperlipidemia)  -smoking  -heart disease  -still undefined - will continue to be worked up as an outpatient  Please take your aspirin and plavix for blood thinning and Atorvastatin for cholesterol medication/blood vessel protection as prescribed to prevent further strokes. Do not skip doses and do not run low on your medication. If you run low on your medication, please contact your doctor. You will follow up outpatient with a member of the stroke team.   Call 911 if you or someone you know experiences the following symptoms of stroke (can be remembered by BE FAST):  •Balance: Dizziness, loss of balance, or a sense of falling  •Eyes: Sudden double vision or blurred vision  •Face: drooping of one side of the face  •Arm: arm weakness  •Speech:  Sudden trouble talking or slurred speech, trouble understanding others  •Time: Time to call for an ambulance fast!

## 2024-12-18 NOTE — CONSULT NOTE ADULT - SUBJECTIVE AND OBJECTIVE BOX
HPI:   **STROKE CODE CONSULT NOTE**  Last known well time/Time of onset of symptoms: 12/17 8:00AM    HPI: 44y Male with PMHx of HTN presenting to ED on 12/17 with sudden onset L-sided face, arm and leg numbness. LKW 12/17 at 8:00AM. States having 2 episodes of L -sided numbness. First episode started on 12/16 at 7:00PM and lasted for about 2 hours. States BP was 160/100 at time of symptoms, which were accompanied by a generalized headache and ?blurry vison. Took his BP med and symptoms eventually resolved. Patient went to bed and woke up at 6:30AM the next day. States L-sided numbness restarted at 8:00AM, again involving face ,a rm and leg. Reports arm and leg numbness have resolved since arriving to ED, however still feels numbness on L-side of mouth. Unable to quantify difference in sensation, but states it is mostly "half of normal". SBP of arrival 192/106, eventually went down to 152/94. ABCD2 score 3 (2 for timing of sx, 1 for BP).     IV-thrombolytic (Y/N):  N                           Reason thrombolytic not given: Improving non-debilitating symptoms. No MT offered due to absence of LVO   (17 Dec 2024 12:32)  HCT: negative  CTA head/Neck: negative  MRI brain w/out: No acute infarct   Echo: No PFO, EF 55-60%    -----------------------------------------------------------------------  SUBJECTIVE:      -----------------------------------------------------------------------  REVIEW OF SYSTEMS  Constitutional - No fever,  +fatigue  Neurological -   Pain -   Bowel -   Bladder -   -----------------------------------------------------------------------  FUNCTIONAL HISTORY:      CURRENT FUNCTIONAL STATUS:    Physical Therapy:      Occupational Therapy:      Speech Therapy:    -----------------------------------------------------------------------  PAST MEDICAL & SURGICAL HISTORY  No pertinent past medical history    No pertinent past medical history    Hypertension    No significant past surgical history    S/P appendectomy      FAMILY HISTORY   No pertinent family history in first degree relatives      SOCIAL HISTORY  As above  -----------------------------------------------------------------------  VITALS  T(C): 36.6 (12-18-24 @ 05:13), Max: 37.1 (12-17-24 @ 18:17)  HR: 71 (12-18-24 @ 04:22) (71 - 111)  BP: 119/69 (12-18-24 @ 04:22) (119/69 - 192/106)  RR: 18 (12-18-24 @ 04:22) (17 - 18)  SpO2: 96% (12-18-24 @ 04:22) (96% - 99%)  Wt(kg): --    PHYSICAL EXAM    -----------------------------------------------------------------------  RECENT LABS  CBC Full  -  ( 18 Dec 2024 05:30 )  WBC Count : 6.45 K/uL  RBC Count : 5.38 M/uL  Hemoglobin : 15.8 g/dL  Hematocrit : 44.5 %  Platelet Count - Automated : 165 K/uL  Mean Cell Volume : 82.7 fl  Mean Cell Hemoglobin : 29.4 pg  Mean Cell Hemoglobin Concentration : 35.5 g/dL  Auto Neutrophil # : x  Auto Lymphocyte # : x  Auto Monocyte # : x  Auto Eosinophil # : x  Auto Basophil # : x  Auto Neutrophil % : x  Auto Lymphocyte % : x  Auto Monocyte % : x  Auto Eosinophil % : x  Auto Basophil % : x    12-18    135  |  100  |  15  ----------------------------<  143[H]  3.6   |  25  |  0.70    Ca    9.0      18 Dec 2024 05:30  Phos  3.9     12-18  Mg     2.0     12-18    TPro  8.8[H]  /  Alb  4.2  /  TBili  0.5  /  DBili  x   /  AST  68[H]  /  ALT  120[H]  /  AlkPhos  64  12-17    Urinalysis Basic - ( 18 Dec 2024 05:30 )    Color: x / Appearance: x / SG: x / pH: x  Gluc: 143 mg/dL / Ketone: x  / Bili: x / Urobili: x   Blood: x / Protein: x / Nitrite: x   Leuk Esterase: x / RBC: x / WBC x   Sq Epi: x / Non Sq Epi: x / Bacteria: x      -----------------------------------------------------------------------  IMAGING:    -----------------------------------------------------------------------  ALLERGIES  No Known Allergies      MEDICATIONS   aspirin enteric coated 81 milliGRAM(s) Oral daily  atorvastatin 80 milliGRAM(s) Oral at bedtime  enoxaparin Injectable 40 milliGRAM(s) SubCutaneous every 12 hours    -----------------------------------------------------------------------   HPI:   **STROKE CODE CONSULT NOTE**  Last known well time/Time of onset of symptoms: 12/17 8:00AM    HPI: 44y Male with PMHx of HTN presenting to ED on 12/17 with sudden onset L-sided face, arm and leg numbness. LKW 12/17 at 8:00AM. States having 2 episodes of L -sided numbness. First episode started on 12/16 at 7:00PM and lasted for about 2 hours. States BP was 160/100 at time of symptoms, which were accompanied by a generalized headache and ?blurry vison. Took his BP med and symptoms eventually resolved. Patient went to bed and woke up at 6:30AM the next day. States L-sided numbness restarted at 8:00AM, again involving face ,a rm and leg. Reports arm and leg numbness have resolved since arriving to ED, however still feels numbness on L-side of mouth. Unable to quantify difference in sensation, but states it is mostly "half of normal". SBP of arrival 192/106, eventually went down to 152/94. ABCD2 score 3 (2 for timing of sx, 1 for BP).     IV-thrombolytic (Y/N):  N                           Reason thrombolytic not given: Improving non-debilitating symptoms. No MT offered due to absence of LVO   (17 Dec 2024 12:32)  HCT: negative  CTA head/Neck: negative  MRI brain w/out: No acute infarct   Echo: No PFO, EF 55-60%    -----------------------------------------------------------------------  SUBJECTIVE:  Patient seen and evaluated at bedside this AM, accompanied by his wife. He reports left face/upper arm sensory symptoms have resolved. Feels close to his baseline. Wife states he was able to walk himself to the bathroom today without issues. He denies any lightheadedness or dizziness. Pending PT eval.   -----------------------------------------------------------------------  REVIEW OF SYSTEMS  Constitutional - No feve  Neurological - improved  Pain - denies  Bowel - no active issues  Bladder - no active issues  -----------------------------------------------------------------------  FUNCTIONAL HISTORY:  Lives in [place] with his wife and two older children. Lives in a private home with no POORNIMA, but FOS inside.   PTA independent with ADLs and functional mobility.  Works as a .    CURRENT FUNCTIONAL STATUS: pending formal assessment    Physical Therapy:  Occupational Therapy:  Speech Therapy:    -----------------------------------------------------------------------  PAST MEDICAL & SURGICAL HISTORY  No pertinent past medical history    No pertinent past medical history    Hypertension    No significant past surgical history    S/P appendectomy      FAMILY HISTORY   No pertinent family history in first degree relatives      SOCIAL HISTORY  As above  -----------------------------------------------------------------------  VITALS  T(C): 36.6 (12-18-24 @ 05:13), Max: 37.1 (12-17-24 @ 18:17)  HR: 71 (12-18-24 @ 04:22) (71 - 111)  BP: 119/69 (12-18-24 @ 04:22) (119/69 - 192/106)  RR: 18 (12-18-24 @ 04:22) (17 - 18)  SpO2: 96% (12-18-24 @ 04:22) (96% - 99%)  Wt(kg): --    PHYSICAL EXAM  Constitutional - NAD, Comfortable  HEENT - NCAT  Neck - Supple, No limited ROM  Chest - Breathing comfortably, No Respiratory distress  Cardiovascular - Regular pulse  Abdomen - No visible abdominal distension  Extremities - No deformities of upper or lower limbs. No calf tenderness   MSK - ROM within functional limits  Neurologic Exam -                    Cognitive - Awake, Alert, AAO to self, place, date, year, situation; follows commands     Communication - Fluent, No dysarthria, repetition intact, attention/concentration intact     Cranial Nerves -  EOMI, No facial asymmetry     Motor -                     LEFT    UE - ShAB 5/5, EF 5/5, EE 5/5, WE 5/5,  5/5                    LEFT    LE - HF 5/5, KE 5/5, DF 5/5, PF 5/5                    RIGHT UE - ShAB 5/5, EF 5/5, EE 5/5, WE 5/5,  5/5                    RIGHT LE - HF 5/5, KE 5/5, DF 5/5, PF 5/5        Sensory - grossly intact to LT     Reflexes - no clonus     Coordination - FTN intact  Psychiatric - Mood stable, Affect WNL   -----------------------------------------------------------------------  RECENT LABS  CBC Full  -  ( 18 Dec 2024 05:30 )  WBC Count : 6.45 K/uL  RBC Count : 5.38 M/uL  Hemoglobin : 15.8 g/dL  Hematocrit : 44.5 %  Platelet Count - Automated : 165 K/uL  Mean Cell Volume : 82.7 fl  Mean Cell Hemoglobin : 29.4 pg  Mean Cell Hemoglobin Concentration : 35.5 g/dL  Auto Neutrophil # : x  Auto Lymphocyte # : x  Auto Monocyte # : x  Auto Eosinophil # : x  Auto Basophil # : x  Auto Neutrophil % : x  Auto Lymphocyte % : x  Auto Monocyte % : x  Auto Eosinophil % : x  Auto Basophil % : x    12-18    135  |  100  |  15  ----------------------------<  143[H]  3.6   |  25  |  0.70    Ca    9.0      18 Dec 2024 05:30  Phos  3.9     12-18  Mg     2.0     12-18    TPro  8.8[H]  /  Alb  4.2  /  TBili  0.5  /  DBili  x   /  AST  68[H]  /  ALT  120[H]  /  AlkPhos  64  12-17    Urinalysis Basic - ( 18 Dec 2024 05:30 )    Color: x / Appearance: x / SG: x / pH: x  Gluc: 143 mg/dL / Ketone: x  / Bili: x / Urobili: x   Blood: x / Protein: x / Nitrite: x   Leuk Esterase: x / RBC: x / WBC x   Sq Epi: x / Non Sq Epi: x / Bacteria: x      -----------------------------------------------------------------------  IMAGING:  < from: MR Head No Cont (12.17.24 @ 20:49) >    FINDINGS:    Diffusion weighted images are negative for recent infarction. T2-FLAIR   images are grossly unremarkable without evidence of prior ischemic   change, edema or space-occupying lesion. No hydrocephalus, mass effect or   midline shift. No extra-axial collection.    Scattered mucosal thickening noted in paranasal sinuses but without fluid   level/acuity.      IMPRESSION:    Negative for recent infarct.    < end of copied text >      < from: CT Brain Stroke Protocol (12.17.24 @ 10:46) >  IMPRESSION:    CT of the brain:  No acute intracranial injury  The results were discussed with Ruth Ann Thomas at 10:46 AM on   12/17/2024. This study was performed on 10:41 AM on 12/17/2024    CT perfusion:  No evidence of CT perfusion deficit.      CTA of the intracranial circulation.  No evidence of stenosis. No evidence of aneurysm.      CTA of the extracranial circulation.  No evidence of carotid stenosis.    < end of copied text >    -----------------------------------------------------------------------  ALLERGIES  No Known Allergies      MEDICATIONS   aspirin enteric coated 81 milliGRAM(s) Oral daily  atorvastatin 80 milliGRAM(s) Oral at bedtime  enoxaparin Injectable 40 milliGRAM(s) SubCutaneous every 12 hours    -----------------------------------------------------------------------

## 2024-12-18 NOTE — CONSULT NOTE ADULT - SUBJECTIVE AND OBJECTIVE BOX
Admission H&P:   **STROKE CODE CONSULT NOTE**  Last known well time/Time of onset of symptoms: 12/17 8:00AM    HPI: 44y Male with PMHx of HTN presenting to ED on 12/17 with sudden onset L-sided face, arm and leg numbness. LKW 12/17 at 8:00AM. States having 2 episodes of L -sided numbness. First episode started on 12/16 at 7:00PM and lasted for about 2 hours. States BP was 160/100 at time of symptoms, which were accompanied by a generalized headache and ?blurry vison. Took his BP med and symptoms eventually resolved. Patient went to bed and woke up at 6:30AM the next day. States L-sided numbness restarted at 8:00AM, again involving face ,a rm and leg. Reports arm and leg numbness have resolved since arriving to ED, however still feels numbness on L-side of mouth. Unable to quantify difference in sensation, but states it is mostly "half of normal". SBP of arrival 192/106, eventually went down to 152/94. ABCD2 score 3 (2 for timing of sx, 1 for BP).     INTERVAL HISTORY:  - patient seen at bedside with wife present  - they endorse that his presenting symptoms have resolved and he no longer feels numbness anywhere in the body  - ambulated with PT this morning without issues, no lightheadedness or dizziness  - last saw PCP 1 year ago, only medication is an ACEI-HCTZ combo pill, at home sometimes hypertensive to 140-150s at which time patient will take an additional half pill  - works as a , is quite active and physical at his job daily without chest pain, shortness of breath  - can walk for a long time, climb stairs without issues  - bMRI done overnight was negative for stroke    T(C): 36.9 (12-17-24 @ 11:15), Max: 36.9 (12-17-24 @ 10:37)  HR: 74 (12-17-24 @ 11:15) (74 - 111)  BP: 152/94 (12-17-24 @ 11:15) (152/94 - 192/106)  RR: 18 (12-17-24 @ 11:15) (17 - 18)  SpO2: 96% (12-17-24 @ 11:15) (96% - 99%)    MEDICATIONS  (STANDING):  MEDICATIONS  (PRN):  Allergies    No Known Allergies    Intolerances      Vital Signs Last 24 Hrs  T(C): 36.9 (17 Dec 2024 11:15), Max: 36.9 (17 Dec 2024 10:37)  T(F): 98.5 (17 Dec 2024 11:15), Max: 98.5 (17 Dec 2024 10:37)  HR: 74 (17 Dec 2024 11:15) (74 - 111)  BP: 152/94 (17 Dec 2024 11:15) (152/94 - 192/106)  BP(mean): --  RR: 18 (17 Dec 2024 11:15) (17 - 18)  SpO2: 96% (17 Dec 2024 11:15) (96% - 99%)    Parameters below as of 17 Dec 2024 11:15  Patient On (Oxygen Delivery Method): room air      -----------------------------------------------------------------------------------------------------------------  IV-thrombolytic (Y/N):  N                           Reason thrombolytic not given: Improving non-debilitating symptoms. No MT offered due to absence of LVO   (17 Dec 2024 12:32)    PAST MEDICAL & SURGICAL HISTORY:  Hypertension    S/P appendectomy  20+ years ago    Home Medications:  benazepril-hydrochlorothiazide 10 mg-12.5 mg oral tablet: 1 tab(s) orally once a day (17 Dec 2024 12:27)    Allergies  No Known Allergies  Intolerances    FAMILY HISTORY:  No pertinent family history in first degree relatives    Social History:  SOCIAL HISTORY:   Smoking status: Denies  Drinking: Socially  Drug Use: Denies (17 Dec 2024 12:32)    REVIEW OF SYSTEMS:  RESPIRATORY: No cough, No dyspnea  CARDIOVASCULAR: No chest pain, or leg swelling  GASTROINTESTINAL: no diarrhea  GENITOURINARY: No dysuria,   NEUROLOGICAL: No numbness, or tremors  ALLERGY AND IMMUNOLOGIC: No hives or eczema    Diet, DASH/TLC:   Sodium & Cholesterol Restricted (12-17-24 @ 12:55) [Active]      CURRENT MEDICATIONS:   aspirin enteric coated 81 milliGRAM(s) Oral daily  atorvastatin 80 milliGRAM(s) Oral at bedtime  enoxaparin Injectable 40 milliGRAM(s) SubCutaneous every 12 hours      VITAL SIGNS, INS/OUTS (last 24 hours):  Vital Signs Last 24 Hrs  T(C): 36.7 (18 Dec 2024 10:18), Max: 37.1 (17 Dec 2024 18:17)  T(F): 98 (18 Dec 2024 10:18), Max: 98.8 (17 Dec 2024 18:17)  HR: 78 (18 Dec 2024 10:10) (71 - 92)  BP: 147/89 (18 Dec 2024 10:10) (119/69 - 163/95)  BP(mean): 113 (18 Dec 2024 10:10) (86 - 114)  RR: 17 (18 Dec 2024 10:10) (17 - 20)  SpO2: 95% (18 Dec 2024 10:10) (95% - 98%)    Parameters below as of 18 Dec 2024 10:10  Patient On (Oxygen Delivery Method): room air      I&O's Summary    17 Dec 2024 07:01  -  18 Dec 2024 07:00  --------------------------------------------------------  IN: 0 mL / OUT: 1200 mL / NET: -1200 mL    18 Dec 2024 07:01  -  18 Dec 2024 10:40  --------------------------------------------------------  IN: 218 mL / OUT: 0 mL / NET: 218 mL        PHYSICAL EXAM:  Gen: Reclining in bed at time of exam, appears stated age, INAD  HEENT: NCAT, MMM  Neck: supple, trachea at midline  CV: RRR, +S1/S2  Pulm: adequate respiratory effort, no increase in work of breathing  Abd: soft, ND  Skin: warm and dry,  Ext: wwp, no edema  Neuro: AOx3, speaking in full sentences   Psych: affect and behavior appropriate, pleasant at time of interview    BASIC LABS:                        15.8   6.45  )-----------( 165      ( 18 Dec 2024 05:30 )             44.5     12-18    135  |  100  |  15  ----------------------------<  143[H]  3.6   |  25  |  0.70    Ca    9.0      18 Dec 2024 05:30  Phos  3.9     12-18  Mg     2.0     12-18    TPro  8.8[H]  /  Alb  4.2  /  TBili  0.5  /  DBili  x   /  AST  68[H]  /  ALT  120[H]  /  AlkPhos  64  12-17    PT/INR - ( 17 Dec 2024 10:38 )   PT: 10.9 sec;   INR: 0.95          PTT - ( 17 Dec 2024 10:38 )  PTT:32.0 sec  Urinalysis Basic - ( 18 Dec 2024 05:30 )    Color: x / Appearance: x / SG: x / pH: x  Gluc: 143 mg/dL / Ketone: x  / Bili: x / Urobili: x   Blood: x / Protein: x / Nitrite: x   Leuk Esterase: x / RBC: x / WBC x   Sq Epi: x / Non Sq Epi: x / Bacteria: x      CAPILLARY BLOOD GLUCOSE    OTHER LABS:  MICRODATA:  IMAGING:    EKG:    #Diet - Diet, DASH/TLC:   Sodium & Cholesterol Restricted (12-17-24 @ 12:55) [Active]    #DVT PPx - enoxaparin Injectable: [Known as LOVENOX]  40 milliGRAM(s), SubCutaneous, every 12 hours  Special Instructions: For patients "At Risk" for DVT/PE administer for duration of hospital stay  Administration Instructions: This is a High Alert Medication. (12-17-24 @ 12:55)

## 2024-12-18 NOTE — DISCHARGE NOTE NURSING/CASE MANAGEMENT/SOCIAL WORK - FINANCIAL ASSISTANCE
[facility] provides services at a reduced cost to those who are determined to be eligible through [facility]’s financial assistance program. Information regarding [facility]’s financial assistance program can be found by going to [email or web address] or by calling [phone].

## 2024-12-18 NOTE — CONSULT NOTE ADULT - ASSESSMENT
44M with history of HTN, DM2 who presented for L face, arm, and leg numbness, admitted for stroke work up with negative imaging.    #CVA work up  #HLD  - management as per primary team  - CTH/CTA negative for infarct or stenosis  - bMRI negative  - TTE with normal biV function, no PFO  - , TSH 2.0, A1c 6.7%  - ASA, high intensity statin per primary team    #HTN  - c/w home ACEI-HCTZ  - discussed with patient and wife to follow up with PCP within 2 weeks to discuss BP control  - would benefit from outpatient remote monitoring    #DM2 - mild, A1c 6.7%  - lifestyle modifications vs start metformin  - PCP follow up as above to discuss long-term plan    DVT: lovenox  Diet: DASH  LDA: none  Full code
44 year old male who presented with sudden onset left face and upper arm numbness, admitted for stroke work up.       Concern for CVA - ASA, Statin' MRI without acute ischimia. ?TIA  HTN - permissive HTN per neuro    PLAN / RECOMMENDATIONS:     # Rehab / Mobilization:   - Initial therapy assessment: [ ] PT  [ ] OT   - Educated patient and family members on post-acute rehabilitation. Discussed anticipated rehab course.  - OOB throughout the day with staff or OOB in chair with meals   - Therapy precautions: falls    # Bracing/Splinting:   - None indicated at this time      # Speech/ Swallow:   - Dysphagia screen [X]  - Diet:  reg + thins    # GI/ Bowel:  - Continue to monitor bowel patterns.     # / Bladder:  - Continue to monitor bladder patterns, avoid constipation.       # DVT Prophylaxis:   - SCDs, chemoprophylaxis - lovenox    # Disposition optimization:   - Disposition recommendation - Anticipate home without skilled needs pending PT eval for functional/ safety assessment

## 2024-12-18 NOTE — DISCHARGE NOTE PROVIDER - NSDCFUADDAPPT_GEN_ALL_CORE_FT
(1) Please follow up with your primary care provider within 2 weeks of discharge from hospital. (1) Please follow up with your primary care provider within 2 weeks of discharge from hospital.    (2) We have emailed dA Purvis NP for a referral for cardiology so that you can be evaluated with a more specific echocardiogram (trans esophageal echocardiogram) that can further evaluate if there are any structural heart abnormalities that could contribute to your symptoms. (1) Please follow up with your primary care provider within 2 weeks of discharge from hospital.    (2) We have emailed on your behalf for a referral for cardiology to have transesophageal echocardiogram that can further evaluate if there are any structural heart abnormalities that could contribute to your symptoms.    (3) You will be scheduled for a neurology appointment within 2 weeks of discharge. (1) Please follow up with your primary care provider within 2 weeks of discharge from hospital.    (2) We have emailed on your behalf for a referral for cardiology to have transesophageal echocardiogram that can further evaluate if there are any structural heart abnormalities that could contribute to your symptoms.    (3) You will be scheduled for a neurology appointment within 2 weeks of discharge. Our team will call you with a date and time.     4) Take aspirin and plavix together everyday for a total of 21 days - you will then JUST take aspirin daily after your 21 day supply of plavix is completed

## 2024-12-18 NOTE — OCCUPATIONAL THERAPY INITIAL EVALUATION ADULT - PERTINENT HX OF CURRENT PROBLEM, REHAB EVAL
44y Male with PMHx of HTN presenting to ED on 12/17 with sudden onset L-sided face, arm and leg numbness. LKW 12/17 at 8:00AM. States having 2 episodes of L -sided numbness. First episode started on 12/16 at 7:00PM and lasted for about 2 hours. States BP was 160/100 at time of symptoms, which were accompanied by a generalized headache and ?blurry vison. Took his BP med and symptoms eventually resolved. Patient went to bed and woke up at 6:30AM the next day. States L-sided numbness restarted at 8:00AM, again involving face ,arm and leg. Reports arm and leg numbness have resolved since arriving to ED, however still feels numbness on L-side of mouth. Unable to quantify difference in sensation, but states it is mostly "half of normal".

## 2024-12-18 NOTE — PHYSICAL THERAPY INITIAL EVALUATION ADULT - PERTINENT HX OF CURRENT PROBLEM, REHAB EVAL
44 year old male who presented with sudden onset left face and upper arm numbness, admitted for stroke work up.

## 2024-12-18 NOTE — DISCHARGE NOTE PROVIDER - HOSPITAL COURSE
#Discharge: do not delete    44y Male with PMHx of HTN presenting to ED on 12/17 with sudden onset L-sided face, arm and leg numbness. LKW 12/17 at 8:00AM. States having 2 episodes of L -sided numbness. First episode started on 12/16 at 7:00PM and lasted for about 2 hours. States BP was 160/100 at time of symptoms, which were accompanied by a generalized headache and ?blurry vison. Took his BP med and symptoms eventually resolved. Patient went to bed and woke up at 6:30AM the next day. States L-sided numbness restarted at 8:00AM, again involving face ,a rm and leg. Reports arm and leg numbness have resolved since arriving to ED, however still feels numbness on L-side of mouth. Unable to quantify difference in sensation, but states it is mostly "half of normal". SBP of arrival 192/106, eventually went down to 152/94. ABCD2 score 3 (2 for timing of sx, 1 for BP).     Hospital course (by problem):  44M with history of HTN, DM2 who presented for L face, arm, and leg numbness, admitted for stroke work up with negative imaging.    #CVA work up  #HLD  CTH/CTA negative for infarct or stenosis. MRI brain negative. TTE with normal biV function, no PFO. , TSH 2.0, A1c 6.7%  - ASA, high intensity statin per primary team, plavix  - follow up outpatient    #HTN  - c/w home ACEI-HCTZ  - discussed with patient and wife to follow up with PCP within 2 weeks to discuss BP control  - would benefit from outpatient remote monitoring    #DM2 - mild, A1c 6.7%  - PCP follow up as above to discuss long-term plan      Patient was discharged to: home    New medications: aspirin 81mg, plavix 75mg  Changes to old medications: --  Medications that were stopped: --    Items to follow up as outpatient:   - f/u PCP    Physical exam at the time of discharge: AAOx3; NAD; RRR, no murmurs; lungs CLAB; abd NT/ND; extremities wwp, no peripheral edema.     Patient was medically optimized, stable and ready for discharge. Plan of care and return precautions were discussed with the patient who verbally stated understanding. On the day of discharge, the patient was seen and examined. Symptoms improved. Vital signs are stable. Labs and imaging reviewed. Patient is medically optimized and hemodynamically stable. Return precautions discussed, medication teach back done, and importance of physician follow up emphasized. The patient verbalized understanding. #Discharge: do not delete    44y Male with PMHx of HTN presenting to ED on 12/17 with sudden onset L-sided face, arm and leg numbness. Admitted for stroke work up with negative imaging including SUMA, MR head, CT head, CTA. Dx TIA, started on aspirin plavix, stable for discharge to home with outpatient follow up.    Hospital course (by problem):  #CVA work up  #HLD  CTH/CTA negative for infarct or stenosis. MRI brain negative. TTE with normal biV function, no PFO. , TSH 2.0, A1c 6.7%  - ASA, high intensity statin per primary team, plavix 21 d  - follow up outpatient    #HTN  - c/w home ACEI-HCTZ  - discussed with patient and wife to follow up with PCP within 2 weeks to discuss BP control  - would benefit from outpatient remote monitoring    #DM2 - mild, A1c 6.7%  - PCP follow up as above to discuss long-term plan      Patient was discharged to: home    New medications: aspirin 81mg, plavix 75mg  Changes to old medications: --  Medications that were stopped: --    Items to follow up as outpatient:   - f/u PCP    Physical exam at the time of discharge: AAOx3; NAD; RRR, no murmurs; lungs CLAB; abd NT/ND; extremities wwp, no peripheral edema.     Patient was medically optimized, stable and ready for discharge. Plan of care and return precautions were discussed with the patient who verbally stated understanding. On the day of discharge, the patient was seen and examined. Symptoms improved. Vital signs are stable. Labs and imaging reviewed. Patient is medically optimized and hemodynamically stable. Return precautions discussed, medication teach back done, and importance of physician follow up emphasized. The patient verbalized understanding. #Discharge: do not delete    44y Male with PMHx of HTN presenting to ED on 12/17 with sudden onset L-sided face, arm and leg numbness. Admitted for stroke work up with negative imaging including TTE, MR head, CT head, CTA. Dx TIA, started on aspirin, plavix, and atorvastatin, stable for discharge to home with outpatient follow up.    Hospital course (by problem):  #CVA work up  #HLD  CTH/CTA negative for infarct or stenosis. MRI brain negative. TTE with normal biV function, no PFO. , TSH 2.0, A1c 6.7%  - ASA, high intensity statin per primary team, plavix 21 d  - follow up outpatient    #HTN  - c/w home ACEI-HCTZ  - discussed with patient and wife to follow up with PCP within 2 weeks to discuss BP control  - would benefit from outpatient remote monitoring    #DM2 - mild, A1c 6.7%  - PCP follow up as above to discuss long-term plan      Patient was discharged to: home    New medications: aspirin 81mg, plavix 75mg daily, atorvastatin 80mg daily    Items to follow up as outpatient:   - f/u PCP    Physical exam at the time of discharge: AAOx3; NAD; RRR, no murmurs; lungs CLAB; abd NT/ND; extremities wwp, no peripheral edema.     NIHSS: 0    Patient was medically optimized, stable and ready for discharge. Plan of care and return precautions were discussed with the patient who verbally stated understanding. On the day of discharge, the patient was seen and examined. Symptoms improved. Vital signs are stable. Labs and imaging reviewed. Patient is medically optimized and hemodynamically stable. Return precautions discussed, medication teach back done, and importance of physician follow up emphasized. The patient verbalized understanding.

## 2024-12-18 NOTE — PHYSICAL THERAPY INITIAL EVALUATION ADULT - GENERAL OBSERVATIONS, REHAB EVAL
PT IE completed. MRS: 0. Patient received semi supine in bed +tele, +heplock IV, NAD, willing to work with PT.

## 2024-12-18 NOTE — DISCHARGE NOTE NURSING/CASE MANAGEMENT/SOCIAL WORK - NSDCFUADDAPPT_GEN_ALL_CORE_FT
(1) Please follow up with your primary care provider within 2 weeks of discharge from hospital.    (2) We have emailed on your behalf for a referral for cardiology to have transesophageal echocardiogram that can further evaluate if there are any structural heart abnormalities that could contribute to your symptoms.    (3) You will be scheduled for a neurology appointment within 2 weeks of discharge. Our team will call you with a date and time.     4) Take aspirin and plavix together everyday for a total of 21 days - you will then JUST take aspirin daily after your 21 day supply of plavix is completed

## 2024-12-18 NOTE — DISCHARGE NOTE PROVIDER - NSDCMRMEDTOKEN_GEN_ALL_CORE_FT
benazepril-hydrochlorothiazide 10 mg-12.5 mg oral tablet: 1 tab(s) orally once a day   aspirin 81 mg oral delayed release tablet: 1 tab(s) orally once a day  atorvastatin 80 mg oral tablet: 1 tab(s) orally once a day (at bedtime)  benazepril-hydrochlorothiazide 10 mg-12.5 mg oral tablet: 1 tab(s) orally once a day  Plavix 75 mg oral tablet: 1 tab(s) orally once a day

## 2024-12-18 NOTE — OCCUPATIONAL THERAPY INITIAL EVALUATION ADULT - ADDITIONAL COMMENTS
Pt lives w/ his wife in private house w/ x13 stairs to negotiate prior to admission and w/o prior use of AEs/DMEs. Pt is right hand dominant. .

## 2024-12-18 NOTE — CONSULT NOTE ADULT - TIME BILLING
Bedside exam and interview   Reviewed vitals, labs   Discussed patient's plan of care with primary team  Documentation of encounter  Excludes teaching time and/or separately reported services

## 2024-12-18 NOTE — OCCUPATIONAL THERAPY INITIAL EVALUATION ADULT - GENERAL OBSERVATIONS, REHAB EVAL
Pt's HERMILO Casanova aware of intent to eval/tx; cleared Pt. Pt received in supine w/ his wife present - +telemetry, forrest. Pt agreeable to OT.

## 2024-12-18 NOTE — PHYSICAL THERAPY INITIAL EVALUATION ADULT - GAIT DEVIATIONS NOTED, PT EVAL
Late entry d/t patient care  Patient concerned regarding whereabouts of cell phone  Room and belongings searched by this RN and patient  No cell phone found  Patient confirmed brought in by [facility]  Patient unconscious PTA per EMS, and patient lethargic upon arrival to [facility] ED  Patient provided with phone number of ambulance company by patient care liaava Salazar RN  02/24/22 [id number]
Demo fairly steady gait, no LOB observed, good navigation of hallway obstacles/decreased step length/decreased stride length/decreased weight-shifting ability

## 2024-12-18 NOTE — DISCHARGE NOTE PROVIDER - NSDCQMSTROKERISK_NEU_ALL_CORE
Diabetes/High blood pressure/High cholesterol/Obesity High blood pressure/High cholesterol/History of a stroke or TIA

## 2024-12-18 NOTE — DISCHARGE NOTE NURSING/CASE MANAGEMENT/SOCIAL WORK - PATIENT PORTAL LINK FT
You can access the FollowMyHealth Patient Portal offered by [facility] by registering at the following website: [email or web address]. By joining [employer]’s FollowMyHealth portal, you will also be able to view your health information using other applications (apps) compatible with our system.

## 2024-12-18 NOTE — PHYSICAL THERAPY INITIAL EVALUATION ADULT - ADDITIONAL COMMENTS
Community ambulator who lives with his wife and 3 children in private house +FOS inside. Independent with all ADLs prior and denies use of AD when ambulating.

## 2024-12-26 DIAGNOSIS — E78.5 HYPERLIPIDEMIA, UNSPECIFIED: ICD-10-CM

## 2024-12-26 DIAGNOSIS — G45.9 TRANSIENT CEREBRAL ISCHEMIC ATTACK, UNSPECIFIED: ICD-10-CM

## 2024-12-26 DIAGNOSIS — E11.9 TYPE 2 DIABETES MELLITUS WITHOUT COMPLICATIONS: ICD-10-CM

## 2024-12-26 DIAGNOSIS — Z90.49 ACQUIRED ABSENCE OF OTHER SPECIFIED PARTS OF DIGESTIVE TRACT: ICD-10-CM

## 2024-12-26 DIAGNOSIS — R29.701 NIHSS SCORE 1: ICD-10-CM

## 2024-12-26 DIAGNOSIS — I10 ESSENTIAL (PRIMARY) HYPERTENSION: ICD-10-CM

## 2024-12-26 DIAGNOSIS — R20.0 ANESTHESIA OF SKIN: ICD-10-CM

## 2025-01-14 ENCOUNTER — APPOINTMENT (OUTPATIENT)
Age: 45
End: 2025-01-14

## 2025-03-26 NOTE — ED PROVIDER NOTE - NSDCPRINTRESULTS_ED_ALL_ED
Kesimpta just sent in this month     Fu needed for refills/ labs needed as well     Another encounter is open to have patient reschedule a fu as she canceled today's appt as she is unsure if she would like to go ahead with the Botox     Appt could have been kept to talk about this and also fu on MS/labs    Patient requests all Lab and Radiology Results on their Discharge Instructions